# Patient Record
Sex: MALE | Race: WHITE | Employment: FULL TIME | URBAN - METROPOLITAN AREA
[De-identification: names, ages, dates, MRNs, and addresses within clinical notes are randomized per-mention and may not be internally consistent; named-entity substitution may affect disease eponyms.]

---
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
## 2022-03-07 ENCOUNTER — APPOINTMENT (OUTPATIENT)
Dept: ULTRASOUND IMAGING | Age: 22
End: 2022-03-07
Payer: COMMERCIAL

## 2022-03-07 ENCOUNTER — APPOINTMENT (OUTPATIENT)
Dept: GENERAL RADIOLOGY | Age: 22
End: 2022-03-07
Payer: COMMERCIAL

## 2022-03-07 ENCOUNTER — HOSPITAL ENCOUNTER (EMERGENCY)
Age: 22
Discharge: HOME HEALTH | End: 2022-03-07
Attending: STUDENT IN AN ORGANIZED HEALTH CARE EDUCATION/TRAINING PROGRAM
Payer: COMMERCIAL

## 2022-03-07 ENCOUNTER — APPOINTMENT (OUTPATIENT)
Dept: CT IMAGING | Age: 22
End: 2022-03-07
Payer: COMMERCIAL

## 2022-03-07 VITALS
BODY MASS INDEX: 23.38 KG/M2 | OXYGEN SATURATION: 97 % | HEIGHT: 71 IN | WEIGHT: 167 LBS | SYSTOLIC BLOOD PRESSURE: 130 MMHG | RESPIRATION RATE: 16 BRPM | DIASTOLIC BLOOD PRESSURE: 62 MMHG | TEMPERATURE: 99.4 F | HEART RATE: 102 BPM

## 2022-03-07 DIAGNOSIS — R10.13 EPIGASTRIC PAIN: Primary | ICD-10-CM

## 2022-03-07 DIAGNOSIS — R19.7 NAUSEA VOMITING AND DIARRHEA: ICD-10-CM

## 2022-03-07 DIAGNOSIS — R11.2 NAUSEA VOMITING AND DIARRHEA: ICD-10-CM

## 2022-03-07 LAB
ALBUMIN SERPL-MCNC: 4.6 G/DL (ref 3.5–5.2)
ALP BLD-CCNC: 65 U/L (ref 40–129)
ALT SERPL-CCNC: 13 U/L (ref 0–40)
ANION GAP SERPL CALCULATED.3IONS-SCNC: 12 MMOL/L (ref 7–16)
AST SERPL-CCNC: 15 U/L (ref 0–39)
BACTERIA: NORMAL /HPF
BASOPHILS ABSOLUTE: 0.02 E9/L (ref 0–0.2)
BASOPHILS RELATIVE PERCENT: 0.2 % (ref 0–2)
BILIRUB SERPL-MCNC: 0.6 MG/DL (ref 0–1.2)
BILIRUBIN URINE: NEGATIVE
BLOOD, URINE: NEGATIVE
BUN BLDV-MCNC: 19 MG/DL (ref 6–20)
CALCIUM SERPL-MCNC: 9.3 MG/DL (ref 8.6–10.2)
CHLORIDE BLD-SCNC: 103 MMOL/L (ref 98–107)
CLARITY: CLEAR
CO2: 22 MMOL/L (ref 22–29)
COLOR: YELLOW
CREAT SERPL-MCNC: 1.2 MG/DL (ref 0.7–1.2)
EOSINOPHILS ABSOLUTE: 0.01 E9/L (ref 0.05–0.5)
EOSINOPHILS RELATIVE PERCENT: 0.1 % (ref 0–6)
GFR AFRICAN AMERICAN: >60
GFR NON-AFRICAN AMERICAN: >60 ML/MIN/1.73
GLUCOSE BLD-MCNC: 124 MG/DL (ref 74–99)
GLUCOSE URINE: NEGATIVE MG/DL
HCT VFR BLD CALC: 47.5 % (ref 37–54)
HEMOGLOBIN: 16.8 G/DL (ref 12.5–16.5)
IMMATURE GRANULOCYTES #: 0.05 E9/L
IMMATURE GRANULOCYTES %: 0.5 % (ref 0–5)
KETONES, URINE: NEGATIVE MG/DL
LACTIC ACID: 2 MMOL/L (ref 0.5–2.2)
LACTIC ACID: 2.4 MMOL/L (ref 0.5–2.2)
LEUKOCYTE ESTERASE, URINE: NEGATIVE
LIPASE: 20 U/L (ref 13–60)
LYMPHOCYTES ABSOLUTE: 0.36 E9/L (ref 1.5–4)
LYMPHOCYTES RELATIVE PERCENT: 3.4 % (ref 20–42)
MCH RBC QN AUTO: 30.8 PG (ref 26–35)
MCHC RBC AUTO-ENTMCNC: 35.4 % (ref 32–34.5)
MCV RBC AUTO: 87 FL (ref 80–99.9)
MONOCYTES ABSOLUTE: 0.45 E9/L (ref 0.1–0.95)
MONOCYTES RELATIVE PERCENT: 4.2 % (ref 2–12)
NEUTROPHILS ABSOLUTE: 9.72 E9/L (ref 1.8–7.3)
NEUTROPHILS RELATIVE PERCENT: 91.6 % (ref 43–80)
NITRITE, URINE: NEGATIVE
PDW BLD-RTO: 13.2 FL (ref 11.5–15)
PH UA: 6 (ref 5–9)
PLATELET # BLD: 303 E9/L (ref 130–450)
PMV BLD AUTO: 9.2 FL (ref 7–12)
POTASSIUM REFLEX MAGNESIUM: 4.2 MMOL/L (ref 3.5–5)
PROTEIN UA: NEGATIVE MG/DL
RBC # BLD: 5.46 E12/L (ref 3.8–5.8)
RBC # BLD: NORMAL 10*6/UL
RBC UA: NORMAL /HPF (ref 0–2)
SODIUM BLD-SCNC: 137 MMOL/L (ref 132–146)
SPECIFIC GRAVITY UA: 1.01 (ref 1–1.03)
TOTAL PROTEIN: 7.8 G/DL (ref 6.4–8.3)
TROPONIN, HIGH SENSITIVITY: 10 NG/L (ref 0–11)
UROBILINOGEN, URINE: 0.2 E.U./DL
WBC # BLD: 10.6 E9/L (ref 4.5–11.5)
WBC UA: NORMAL /HPF (ref 0–5)

## 2022-03-07 PROCEDURE — 6360000004 HC RX CONTRAST MEDICATION: Performed by: RADIOLOGY

## 2022-03-07 PROCEDURE — 71046 X-RAY EXAM CHEST 2 VIEWS: CPT

## 2022-03-07 PROCEDURE — 76870 US EXAM SCROTUM: CPT

## 2022-03-07 PROCEDURE — 84484 ASSAY OF TROPONIN QUANT: CPT

## 2022-03-07 PROCEDURE — 6370000000 HC RX 637 (ALT 250 FOR IP): Performed by: PHYSICIAN ASSISTANT

## 2022-03-07 PROCEDURE — 83690 ASSAY OF LIPASE: CPT

## 2022-03-07 PROCEDURE — 96374 THER/PROPH/DIAG INJ IV PUSH: CPT

## 2022-03-07 PROCEDURE — 36415 COLL VENOUS BLD VENIPUNCTURE: CPT

## 2022-03-07 PROCEDURE — 85025 COMPLETE CBC W/AUTO DIFF WBC: CPT

## 2022-03-07 PROCEDURE — 74177 CT ABD & PELVIS W/CONTRAST: CPT

## 2022-03-07 PROCEDURE — 6360000002 HC RX W HCPCS: Performed by: PHYSICIAN ASSISTANT

## 2022-03-07 PROCEDURE — 83605 ASSAY OF LACTIC ACID: CPT

## 2022-03-07 PROCEDURE — 2580000003 HC RX 258: Performed by: PHYSICIAN ASSISTANT

## 2022-03-07 PROCEDURE — 93005 ELECTROCARDIOGRAM TRACING: CPT | Performed by: PHYSICIAN ASSISTANT

## 2022-03-07 PROCEDURE — 80053 COMPREHEN METABOLIC PANEL: CPT

## 2022-03-07 PROCEDURE — 81001 URINALYSIS AUTO W/SCOPE: CPT

## 2022-03-07 PROCEDURE — 93975 VASCULAR STUDY: CPT

## 2022-03-07 PROCEDURE — 99284 EMERGENCY DEPT VISIT MOD MDM: CPT

## 2022-03-07 RX ORDER — 0.9 % SODIUM CHLORIDE 0.9 %
1000 INTRAVENOUS SOLUTION INTRAVENOUS ONCE
Status: COMPLETED | OUTPATIENT
Start: 2022-03-07 | End: 2022-03-07

## 2022-03-07 RX ORDER — ONDANSETRON 2 MG/ML
4 INJECTION INTRAMUSCULAR; INTRAVENOUS ONCE
Status: COMPLETED | OUTPATIENT
Start: 2022-03-07 | End: 2022-03-07

## 2022-03-07 RX ORDER — ACETAMINOPHEN 325 MG/1
650 TABLET ORAL ONCE
Status: COMPLETED | OUTPATIENT
Start: 2022-03-07 | End: 2022-03-07

## 2022-03-07 RX ORDER — ONDANSETRON 4 MG/1
4 TABLET, ORALLY DISINTEGRATING ORAL EVERY 8 HOURS PRN
Qty: 10 TABLET | Refills: 0 | Status: SHIPPED | OUTPATIENT
Start: 2022-03-07 | End: 2022-07-19

## 2022-03-07 RX ADMIN — ACETAMINOPHEN 650 MG: 325 TABLET ORAL at 13:38

## 2022-03-07 RX ADMIN — SODIUM CHLORIDE 1000 ML: 9 INJECTION, SOLUTION INTRAVENOUS at 13:38

## 2022-03-07 RX ADMIN — SODIUM CHLORIDE 1000 ML: 9 INJECTION, SOLUTION INTRAVENOUS at 14:25

## 2022-03-07 RX ADMIN — LIDOCAINE HYDROCHLORIDE: 20 SOLUTION ORAL; TOPICAL at 13:38

## 2022-03-07 RX ADMIN — IOPAMIDOL 75 ML: 755 INJECTION, SOLUTION INTRAVENOUS at 14:31

## 2022-03-07 RX ADMIN — ONDANSETRON 4 MG: 2 INJECTION INTRAMUSCULAR; INTRAVENOUS at 13:38

## 2022-03-07 ASSESSMENT — PAIN DESCRIPTION - ORIENTATION: ORIENTATION: MID

## 2022-03-07 ASSESSMENT — PAIN SCALES - GENERAL
PAINLEVEL_OUTOF10: 3
PAINLEVEL_OUTOF10: 3

## 2022-03-07 ASSESSMENT — PAIN DESCRIPTION - PAIN TYPE: TYPE: ACUTE PAIN

## 2022-03-07 ASSESSMENT — PAIN DESCRIPTION - DESCRIPTORS: DESCRIPTORS: STABBING

## 2022-03-07 ASSESSMENT — PAIN DESCRIPTION - LOCATION: LOCATION: ABDOMEN

## 2022-03-07 NOTE — ED PROVIDER NOTES
ED Attending shared visit  CC: Nika       [facility]  Department of Emergency Medicine   ED  Encounter Note  Admit Date/RoomTime: 3/7/2022  1:08 PM  ED Room: 33/33    NAME: Kory Banks  : 2000  MRN: [id number]     Chief Complaint:  Abdominal Pain (symptoms started this am), Emesis, and Diarrhea    History of Present Illness        Kory Banks is a 25 y.o. old male who presents to the emergency department by private vehicle, for gradual onset stabbing pain in the epigastrium without radiation and a mild amount just below the umbilicus which began several hours prior to arrival.  There has been a similar episode in the past with a diagnosis of gastritis with no chronic medication usage. Since onset the symptoms have been remaining constant. The pain is associated with fever, nausea, vomiting 2x that woke him up this morning, and watery diarrhea. He denies sick contacts, recent travel, or recent ABX usage. Pt denies marijuana or other illicit drug usage. He states he last drank alcohol several days ago, two mixed drinks and a beer. He states that he drinks less than 1x/week. He states he'll occasionally use ibuprofen for headaches, around 1x/week. The pain is aggravated by drinking water and relieved by nothing. There has been NO back pain, chest pain, shortness of breath, constipation, dark/black stools, blood in stool, blood in emesis, urinary frequency, dysuria, hematuria, urinary urgency, urinary incontinence, penile discharge or scrotal/testicular pain. Pt notes a hx of testicular torsion several years ago which was treated at that time so it would not occur again by Dr. Guy Gray. He denies concern for STDs. He denies any other significant PMH. He denies taking medications on a regular basis. ROS   Pertinent positives and negatives are stated within HPI, all other systems reviewed and are negative. Past Medical History:  has no past medical history on file.     Surgical History:  has a past surgical history that includes Testicle removal (Right, 11/25/2019). Social History:  reports that he has never smoked. He has never used smokeless tobacco. He reports that he does not drink alcohol and does not use drugs. Family History: family history is not on file. Allergies: Patient has no known allergies. Physical Exam   Oxygen Saturation Interpretation: Normal.        ED Triage Vitals [03/07/22 1305]   BP Temp Temp Source Heart Rate Resp SpO2 Height Weight - Scale   120/64 100.1 °F (37.8 °C) Temporal (!) 120 14 96 % 5' 11\" (1.803 m) 167 lb (75.8 kg)       Vitals:    03/07/22 1602 03/07/22 1637 03/07/22 1640 03/07/22 1826   BP: (!) 103/58  109/66 130/62   Pulse: (!) 103 98  (!) 102   Resp: 16   16   Temp: 100.4 °F (38 °C)  98.9 °F (37.2 °C) 99.4 °F (37.4 °C)   TempSrc:       SpO2: 96% 97%  97%   Weight:       Height:         Physical Exam  General Appearance/Constitutional:  Alert, development consistent with age. HEENT:  NC/NT. PERRLA. Airway patent. Neck:  Supple. No lymphadenopathy. Respiratory: Lungs Clear to auscultation and breath sounds equal.  CV: tachycardia with regular rhythm; no murmurs, rubs, or gallops  GI:  normal appearing, non-distended with no visible hernias. Bowel sounds: normal bowel sounds. Tenderness: There is mild tenderness present - located in the epigastrium. No TTP below the umbilicus, There is no rebound tenderness. , There is no guarding. , There is no distension. , There is no rigidity; There is no pulsatile mass. No McBurney's point TTP; no lower abdominal pain; no RUQ pain; negative ortega's sign         Liver: non-tender and non-palpable. Spleen:  non-tender and non-palpable. Back: CVA Tenderness: No CVA tenderness. Integument:  Normal turgor. Warm, dry, without visible rash, unless noted elsewhere. Neurological:  Orientation age-appropriate. Motor functions intact.     Vena Luttrell / Adriana Savant Results   (All laboratory and radiology results have been personally reviewed by myself)  Labs:  Results for orders placed or performed during the hospital encounter of 03/07/22   CBC with Auto Differential   Result Value Ref Range    WBC 10.6 4.5 - 11.5 E9/L    RBC 5.46 3.80 - 5.80 E12/L    Hemoglobin 16.8 (H) 12.5 - 16.5 g/dL    Hematocrit 47.5 37.0 - 54.0 %    MCV 87.0 80.0 - 99.9 fL    MCH 30.8 26.0 - 35.0 pg    MCHC 35.4 (H) 32.0 - 34.5 %    RDW 13.2 11.5 - 15.0 fL    Platelets 265 483 - 857 E9/L    MPV 9.2 7.0 - 12.0 fL    Neutrophils % 91.6 (H) 43.0 - 80.0 %    Immature Granulocytes % 0.5 0.0 - 5.0 %    Lymphocytes % 3.4 (L) 20.0 - 42.0 %    Monocytes % 4.2 2.0 - 12.0 %    Eosinophils % 0.1 0.0 - 6.0 %    Basophils % 0.2 0.0 - 2.0 %    Neutrophils Absolute 9.72 (H) 1.80 - 7.30 E9/L    Immature Granulocytes # 0.05 E9/L    Lymphocytes Absolute 0.36 (L) 1.50 - 4.00 E9/L    Monocytes Absolute 0.45 0.10 - 0.95 E9/L    Eosinophils Absolute 0.01 (L) 0.05 - 0.50 E9/L    Basophils Absolute 0.02 0.00 - 0.20 E9/L    RBC Morphology Normal    Comprehensive Metabolic Panel w/ Reflex to MG   Result Value Ref Range    Sodium 137 132 - 146 mmol/L    Potassium reflex Magnesium 4.2 3.5 - 5.0 mmol/L    Chloride 103 98 - 107 mmol/L    CO2 22 22 - 29 mmol/L    Anion Gap 12 7 - 16 mmol/L    Glucose 124 (H) 74 - 99 mg/dL    BUN 19 6 - 20 mg/dL    CREATININE 1.2 0.7 - 1.2 mg/dL    GFR Non-African American >60 >=60 mL/min/1.73    GFR African American >60     Calcium 9.3 8.6 - 10.2 mg/dL    Total Protein 7.8 6.4 - 8.3 g/dL    Albumin 4.6 3.5 - 5.2 g/dL    Total Bilirubin 0.6 0.0 - 1.2 mg/dL    Alkaline Phosphatase 65 40 - 129 U/L    ALT 13 0 - 40 U/L    AST 15 0 - 39 U/L   Lipase   Result Value Ref Range    Lipase 20 13 - 60 U/L   Lactic Acid   Result Value Ref Range    Lactic Acid 2.4 (H) 0.5 - 2.2 mmol/L   Urinalysis with Microscopic   Result Value Ref Range    Color, UA Yellow Straw/Yellow    Clarity, UA Clear Clear    Glucose, Ur Negative Negative mg/dL    Bilirubin Urine Negative Negative    Ketones, Urine Negative Negative mg/dL    Specific Gravity, UA 1.010 1.005 - 1.030    Blood, Urine Negative Negative    pH, UA 6.0 5.0 - 9.0    Protein, UA Negative Negative mg/dL    Urobilinogen, Urine 0.2 <2.0 E.U./dL    Nitrite, Urine Negative Negative    Leukocyte Esterase, Urine Negative Negative    WBC, UA NONE 0 - 5 /HPF    RBC, UA NONE 0 - 2 /HPF    Bacteria, UA NONE SEEN None Seen /HPF   Troponin   Result Value Ref Range    Troponin, High Sensitivity 10 0 - 11 ng/L   Lactic Acid   Result Value Ref Range    Lactic Acid 2.0 0.5 - 2.2 mmol/L   EKG 12 Lead   Result Value Ref Range    Ventricular Rate 116 BPM    Atrial Rate 116 BPM    P-R Interval 134 ms    QRS Duration 88 ms    Q-T Interval 292 ms    QTc Calculation (Bazett) 405 ms    P Axis 74 degrees    R Axis 68 degrees    T Axis 44 degrees     Imaging: All Radiology results interpreted by Radiologist unless otherwise noted. US DUP ABD PEL RETRO SCROT COMPLETE   Final Result   Normal appearance of the bilateral testicles. No evidence of intra   testicular mass or acute scrotal pathology. Normal testicular vascularity. US SCROTUM AND TESTICLES   Final Result   Normal appearance of the bilateral testicles. No evidence of intra   testicular mass or acute scrotal pathology. Normal testicular vascularity. CT ABDOMEN PELVIS W IV CONTRAST Additional Contrast? None   Final Result   Fluid filling the distended stomach and small bowel suggesting a   gastroenteritis. No wall thickening or signs of obvious obstruction. The   appendix is normal in appearance. Remainder of the abdomen and pelvis is   grossly unremarkable. RECOMMENDATIONS:   Unavailable         XR CHEST (2 VW)   Final Result   No acute process. EKG: This EKG is signed and interpreted by Dr. Belem Durand.     Rate: 116  Rhythm: Sinus  Interpretation: no acute changes  Comparison: no previous EKG available    ED Course / Medical Decision Making     Medications   0.9 % sodium chloride bolus (0 mLs IntraVENous Stopped 3/7/22 1418)   acetaminophen (TYLENOL) tablet 650 mg (650 mg Oral Given 3/7/22 1338)   ondansetron (ZOFRAN) injection 4 mg (4 mg IntraVENous Given 3/7/22 1338)   aluminum & magnesium hydroxide-simethicone (MAALOX) 30 mL, lidocaine viscous hcl (XYLOCAINE) 5 mL (GI COCKTAIL) ( Oral Given 3/7/22 1338)   0.9 % sodium chloride bolus (0 mLs IntraVENous Stopped 3/7/22 1604)   iopamidol (ISOVUE-370) 76 % injection 75 mL (75 mLs IntraVENous Given 3/7/22 1431)        Re-Evaluations:  3/7/22      Time: 1436    Patients condition is improving after treatment. Notes he still has some upper abdominal pain but no TTP on exam.    Consultations:             None    Procedures:   none    MDM:  Pt presents for epigastric pain and NVD that started this AM with a fever. Minimal lower abdominal pain per pt but no TTP on exam to the lower abdomen. Tachycardic when he arrived. Ordered fluids, GI cocktail, Tylenol, and antiemetic. Will do cardiac work-up too due to tachycardia. Labs/imaging ordered and reviewed above. Improvement in pain and NV with meds. Possible gastroenteritis per CT of the abdomen/pelvis. I spoke with Dr. Lali Martínez about the pt. She suggests repeat lactic and US of scrotum/testicles due to vague lower abdominal pain and hx of testicular torsion. 1710 I called US to get imaging done and they said they would grab him next. US WNL with no testicular torsion. Repeat lactic shows improvement (2.4 to 2.0). Patient re-evaluated prior to discharge. Non-surgical abdomen upon re-examination. No guarding, rigidity, or rebound tenderness. Pt tolerating PO fluids in the ER. DDX includes gastroenteritis. Will dc with Zofran. Will have him follow-up with his PCP. Tylenol for abdominal pain. All questions answered. Patient agreeable with the plan.  Patient is in no acute distress, non-toxic, and appropriate for outpatient management. Pt educated on reasons to return to the ER, including need to return for new or worsening symptoms. Plan of Care/Counseling:  JOHN ZIMMERMAN PA-C and EM Attending Physician  reviewed today's visit with the patient in addition to providing specific details for the plan of care and counseling regarding the diagnosis and prognosis. Questions are answered at this time and are agreeable with the plan. Assessment      1. Epigastric pain    2. Nausea vomiting and diarrhea      This patient's ED course included: a personal history and physicial examination, re-evaluation prior to disposition and multiple bedside re-evaluations  This patient has improved during their ED course. Plan   Discharged home  Patient condition is stable. New Medications     New Prescriptions    ONDANSETRON (ZOFRAN ODT) 4 MG DISINTEGRATING TABLET    Take 1 tablet by mouth every 8 hours as needed for Nausea or Vomiting     Electronically signed by Leslie Torres PA-C   DD: 3/7/22  **This report was transcribed using voice recognition software. Every effort was made to ensure accuracy; however, inadvertent computerized transcription errors may be present.   END OF PROVIDER NOTE        Leti Goodwin PA-C  03/07/22 [id number]

## 2022-03-07 NOTE — ED NOTES
Department of Emergency Medicine    FIRST PROVIDER TRIAGE NOTE             Independent MLP           3/7/22  1:05 PM EST    Date of Encounter: 3/7/22   MRN: [id number]    Vitals:    03/07/22 1305   BP: 120/64   Pulse: (!) 120   Resp: 14   Temp: 100.1 °F (37.8 °C)   TempSrc: Temporal   SpO2: 96%   Weight: 167 lb (75.8 kg)   Height: 5' 11\" (1.803 m)      HPI: Radha Nieto is a 25 y.o. male who presents to the ED for Abdominal Pain (symptoms started this am), Emesis, and Diarrhea    ROS: Negative for cp or sob. Physical Exam:   Gen Appearance/Constitutional: alert  CV: tachycardia     Initial Plan of Care: All treatment areas with department are currently occupied. Plan to order/Initiate the following while awaiting opening in ED: labs.     Initial Plan of Care: Initiate Treatment-Testing, Proceed toTreatment Area When Bed Available for ED Attending/MLP to Continue Care    Electronically signed by Demetra Benson PA-C   DD: 3/7/22       Demetra Benson PA-C  03/07/22 2988

## 2022-03-10 LAB
EKG ATRIAL RATE: 116 BPM
EKG P AXIS: 74 DEGREES
EKG P-R INTERVAL: 134 MS
EKG Q-T INTERVAL: 292 MS
EKG QRS DURATION: 88 MS
EKG QTC CALCULATION (BAZETT): 405 MS
EKG R AXIS: 68 DEGREES
EKG T AXIS: 44 DEGREES
EKG VENTRICULAR RATE: 116 BPM

## 2022-03-10 PROCEDURE — 93010 ELECTROCARDIOGRAM REPORT: CPT | Performed by: INTERNAL MEDICINE

## 2022-07-19 ENCOUNTER — OFFICE VISIT (OUTPATIENT)
Dept: FAMILY MEDICINE CLINIC | Age: 22
End: 2022-07-19
Payer: COMMERCIAL

## 2022-07-19 VITALS
HEART RATE: 88 BPM | BODY MASS INDEX: 26.48 KG/M2 | DIASTOLIC BLOOD PRESSURE: 68 MMHG | TEMPERATURE: 98.8 F | RESPIRATION RATE: 16 BRPM | SYSTOLIC BLOOD PRESSURE: 122 MMHG | WEIGHT: 185 LBS | HEIGHT: 70 IN | OXYGEN SATURATION: 98 %

## 2022-07-19 DIAGNOSIS — B34.9 VIRAL ILLNESS: Primary | ICD-10-CM

## 2022-07-19 LAB
INFLUENZA A ANTIBODY: NORMAL
INFLUENZA B ANTIBODY: NORMAL
Lab: NORMAL
PERFORMING INSTRUMENT: NORMAL
QC PASS/FAIL: NORMAL
SARS-COV-2, POC: NORMAL

## 2022-07-19 PROCEDURE — 87426 SARSCOV CORONAVIRUS AG IA: CPT | Performed by: FAMILY MEDICINE

## 2022-07-19 PROCEDURE — 87804 INFLUENZA ASSAY W/OPTIC: CPT | Performed by: FAMILY MEDICINE

## 2022-07-19 PROCEDURE — 99213 OFFICE O/P EST LOW 20 MIN: CPT | Performed by: FAMILY MEDICINE

## 2022-07-19 RX ORDER — ONDANSETRON 4 MG/1
4 TABLET, ORALLY DISINTEGRATING ORAL 3 TIMES DAILY PRN
Qty: 10 TABLET | Refills: 0 | Status: SHIPPED | OUTPATIENT
Start: 2022-07-19

## 2022-07-19 SDOH — ECONOMIC STABILITY: FOOD INSECURITY: WITHIN THE PAST 12 MONTHS, YOU WORRIED THAT YOUR FOOD WOULD RUN OUT BEFORE YOU GOT MONEY TO BUY MORE.: NEVER TRUE

## 2022-07-19 SDOH — ECONOMIC STABILITY: FOOD INSECURITY: WITHIN THE PAST 12 MONTHS, THE FOOD YOU BOUGHT JUST DIDN'T LAST AND YOU DIDN'T HAVE MONEY TO GET MORE.: NEVER TRUE

## 2022-07-19 ASSESSMENT — ENCOUNTER SYMPTOMS
ABDOMINAL PAIN: 0
TROUBLE SWALLOWING: 0
BACK PAIN: 0
SHORTNESS OF BREATH: 0
SORE THROAT: 0
NAUSEA: 1
EYE DISCHARGE: 0
DIARRHEA: 0
BLOOD IN STOOL: 0
VOMITING: 1
SINUS PAIN: 0
EYE REDNESS: 0
ALLERGIC/IMMUNOLOGIC NEGATIVE: 1
EYE PAIN: 0
PHOTOPHOBIA: 0
CHEST TIGHTNESS: 0
COUGH: 0

## 2022-07-19 ASSESSMENT — SOCIAL DETERMINANTS OF HEALTH (SDOH): HOW HARD IS IT FOR YOU TO PAY FOR THE VERY BASICS LIKE FOOD, HOUSING, MEDICAL CARE, AND HEATING?: NOT HARD AT ALL

## 2022-07-19 ASSESSMENT — PATIENT HEALTH QUESTIONNAIRE - PHQ9
SUM OF ALL RESPONSES TO PHQ QUESTIONS 1-9: 0
1. LITTLE INTEREST OR PLEASURE IN DOING THINGS: 0
SUM OF ALL RESPONSES TO PHQ QUESTIONS 1-9: 0
2. FEELING DOWN, DEPRESSED OR HOPELESS: 0
SUM OF ALL RESPONSES TO PHQ9 QUESTIONS 1 & 2: 0

## 2022-07-19 ASSESSMENT — LIFESTYLE VARIABLES: HOW OFTEN DO YOU HAVE A DRINK CONTAINING ALCOHOL: NEVER

## 2022-07-19 NOTE — PROGRESS NOTES
22  Rosalina Harris : 2000 Sex: male  Age: 25 y.o. Assessment and Plan:  Harry Jorge was seen today for headache. Diagnoses and all orders for this visit:    Viral illness  -     POCT Influenza A/B  -     POCT COVID-19, Antigen  -     ondansetron (ZOFRAN-ODT) 4 MG disintegrating tablet; Take 1 tablet by mouth 3 times daily as needed for Nausea or Vomiting    Symptomatic treatment including Tylenol or ibuprofen, fluids, rest, Claritin. His complaints not improve, or worsen in any way, should present to the emergency room immediately. Return 1 to 3 days with PCP if not improved. Chief Complaint   Patient presents with    Headache     Started last night wont go away. Congestion, pressure, drainage, facial tenderness, mild headache, low-grade fever, nausea, vomiting x1, onset last night denies fever, chills, diaphoresis, nausea, vomiting, decreased oral intake. Denies other GI or  complaints. He had COVID 5 months ago. Using Tylenol or ibuprofen and headache is now resolved. This was not the worst headache he has ever had. Review of Systems   Constitutional:  Positive for fatigue and fever. Negative for appetite change and unexpected weight change. HENT:  Negative for congestion, ear pain, hearing loss, sinus pain, sore throat and trouble swallowing. Eyes:  Negative for photophobia, pain, discharge and redness. Respiratory:  Negative for cough, chest tightness and shortness of breath. Cardiovascular:  Negative for chest pain, palpitations and leg swelling. Gastrointestinal:  Positive for nausea and vomiting. Negative for abdominal pain, blood in stool and diarrhea. Endocrine: Negative. Genitourinary:  Negative for dysuria, flank pain, frequency and hematuria. Musculoskeletal:  Negative for arthralgias, back pain, joint swelling and myalgias. Skin: Negative. Allergic/Immunologic: Negative. Neurological:  Positive for headaches.  Negative for ill-appearing, toxic-appearing or diaphoretic. HENT:      Head: Atraumatic. Right Ear: External ear normal.      Left Ear: External ear normal.      Nose: Nose normal.      Mouth/Throat:      Pharynx: No oropharyngeal exudate. Eyes:      Conjunctiva/sclera: Conjunctivae normal.      Pupils: Pupils are equal, round, and reactive to light. Neck:      Thyroid: No thyromegaly. Trachea: No tracheal deviation. Cardiovascular:      Rate and Rhythm: Normal rate and regular rhythm. Heart sounds: No murmur heard. No friction rub. No gallop. Pulmonary:      Effort: Pulmonary effort is normal. No respiratory distress. Breath sounds: Normal breath sounds. Abdominal:      General: Bowel sounds are normal.      Palpations: Abdomen is soft. Musculoskeletal:         General: No tenderness or deformity. Normal range of motion. Cervical back: Normal range of motion and neck supple. Lymphadenopathy:      Cervical: No cervical adenopathy. Skin:     General: Skin is warm and dry. Capillary Refill: Capillary refill takes less than 2 seconds. Findings: No rash. Neurological:      Mental Status: He is alert and oriented to person, place, and time. Sensory: No sensory deficit. Motor: No abnormal muscle tone.       Coordination: Coordination normal.      Deep Tendon Reflexes: Reflexes normal.           Seen By:  Roger Conner DO

## (undated) DEVICE — GOWN,SIRUS,FABRNF,XL,20/CS: Brand: MEDLINE

## (undated) DEVICE — SPONGE,LAP,4"X18",XR,ST,5/PK,40PK/CS: Brand: MEDLINE INDUSTRIES, INC.

## (undated) DEVICE — 4-PORT MANIFOLD: Brand: NEPTUNE 2

## (undated) DEVICE — GAUZE,SPONGE,4"X4",8PLY,STRL,LF,10/TRAY: Brand: MEDLINE

## (undated) DEVICE — MARKER,SKIN,WI/RULER AND LABELS: Brand: MEDLINE

## (undated) DEVICE — SUPPORT SCROT L AD L39-44IN SFT KNIT PCH SUSP WAISTBAND

## (undated) DEVICE — SOLUTION IV IRRIG 500ML 0.9% SODIUM CHL 2F7123

## (undated) DEVICE — BLADE CLIPPER GEN PURP NS

## (undated) DEVICE — COVER HNDL LT DISP

## (undated) DEVICE — SUPPORT SCROT L KNIT COT SUSP COMFORTABLE SFT LT ADJ E

## (undated) DEVICE — ELECTRODE PT RET AD L9FT HI MOIST COND ADH HYDRGEL CORDED

## (undated) DEVICE — SHEET, T, LAPAROTOMY, STERILE: Brand: MEDLINE

## (undated) DEVICE — PACK PROCEDURE SURG GEN CUST

## (undated) DEVICE — GLOVE SURG SZ 85 L116IN THK98MIL STRW LTX POLYMER BEAD CUF

## (undated) DEVICE — GOWN,SIRUS,FABRNF,L,20/CS: Brand: MEDLINE

## (undated) DEVICE — READY WET SKIN SCRUB TRAY-LF: Brand: MEDLINE INDUSTRIES, INC.

## (undated) DEVICE — GARMENT,MEDLINE,DVT,INT,CALF,MED, GEN2: Brand: MEDLINE